# Patient Record
Sex: MALE | Race: WHITE | NOT HISPANIC OR LATINO | URBAN - METROPOLITAN AREA
[De-identification: names, ages, dates, MRNs, and addresses within clinical notes are randomized per-mention and may not be internally consistent; named-entity substitution may affect disease eponyms.]

---

## 2018-09-13 ENCOUNTER — EMERGENCY (EMERGENCY)
Facility: HOSPITAL | Age: 53
LOS: 1 days | Discharge: HOME | End: 2018-09-13
Attending: EMERGENCY MEDICINE
Payer: SELF-PAY

## 2018-09-13 VITALS
OXYGEN SATURATION: 98 % | HEART RATE: 83 BPM | SYSTOLIC BLOOD PRESSURE: 132 MMHG | RESPIRATION RATE: 18 BRPM | DIASTOLIC BLOOD PRESSURE: 87 MMHG | TEMPERATURE: 98 F

## 2018-09-13 VITALS — WEIGHT: 134.92 LBS | HEIGHT: 69 IN

## 2018-09-13 PROCEDURE — 10060 I&D ABSCESS SIMPLE/SINGLE: CPT

## 2018-09-13 PROCEDURE — 99283 EMERGENCY DEPT VISIT LOW MDM: CPT | Mod: 25

## 2018-09-13 PROCEDURE — 10060 I&D ABSCESS SIMPLE/SINGLE: CPT | Mod: F7

## 2018-09-13 RX ORDER — IBUPROFEN 200 MG
600 TABLET ORAL ONCE
Refills: 0 | Status: COMPLETED | OUTPATIENT
Start: 2018-09-13 | End: 2018-09-13

## 2018-09-13 RX ORDER — CIPROFLOXACIN LACTATE 400MG/40ML
500 VIAL (ML) INTRAVENOUS ONCE
Refills: 0 | Status: COMPLETED | OUTPATIENT
Start: 2018-09-13 | End: 2018-09-13

## 2018-09-13 RX ORDER — MOXIFLOXACIN HYDROCHLORIDE TABLETS, 400 MG 400 MG/1
1 TABLET, FILM COATED ORAL
Qty: 14 | Refills: 0
Start: 2018-09-13 | End: 2018-09-19

## 2018-09-13 RX ADMIN — Medication 500 MILLIGRAM(S): at 10:04

## 2018-09-13 RX ADMIN — Medication 600 MILLIGRAM(S): at 10:04

## 2018-09-13 NOTE — ED STATDOCS - OBJECTIVE STATEMENT
54 y/o M pt presents to ED c/o finger pain/swelling s/p injury last week. Pt works in [employer] and states that a red snapper's dorsal fin hit his finger. Pt used peroxide and saltwater at home to treat with no improvement.

## 2018-09-13 NOTE — ED ADULT NURSE NOTE - OBJECTIVE STATEMENT
Pt presents to ED for eval of c/o right hand swelling and pain x1 week s/p abrasion secondary to dorsal fin of fish. Pt denies fever or chills. C/o intermittent radiating pain to wrist and elbow. Ongoing eval in progress.

## 2018-09-13 NOTE — ED ADULT NURSE NOTE - NSIMPLEMENTINTERV_GEN_ALL_ED
Implemented All Universal Safety Interventions:  [place] to call system. Call bell, personal items and telephone within reach. Instruct patient to call for assistance. Room bathroom lighting operational. Non-slip footwear when patient is off stretcher. Physically safe environment: no spills, clutter or unnecessary equipment. Stretcher in lowest position, wheels locked, appropriate side rails in place.

## 2019-06-06 ENCOUNTER — EMERGENCY (EMERGENCY)
Facility: HOSPITAL | Age: 54
LOS: 1 days | Discharge: HOME | End: 2019-06-06
Attending: EMERGENCY MEDICINE
Payer: SELF-PAY

## 2019-06-06 VITALS
SYSTOLIC BLOOD PRESSURE: 154 MMHG | DIASTOLIC BLOOD PRESSURE: 96 MMHG | OXYGEN SATURATION: 97 % | TEMPERATURE: 99 F | HEIGHT: 69 IN | HEART RATE: 100 BPM | RESPIRATION RATE: 16 BRPM | WEIGHT: 130.07 LBS

## 2019-06-06 PROCEDURE — 99283 EMERGENCY DEPT VISIT LOW MDM: CPT

## 2019-06-06 PROCEDURE — 73630 X-RAY EXAM OF FOOT: CPT

## 2019-06-06 PROCEDURE — 87070 CULTURE OTHR SPECIMN AEROBIC: CPT

## 2019-06-06 PROCEDURE — 73630 X-RAY EXAM OF FOOT: CPT | Mod: 26,RT

## 2019-06-06 RX ORDER — TETANUS TOXOID, REDUCED DIPHTHERIA TOXOID AND ACELLULAR PERTUSSIS VACCINE, ADSORBED 5; 2.5; 8; 8; 2.5 [IU]/.5ML; [IU]/.5ML; UG/.5ML; UG/.5ML; UG/.5ML
0.5 SUSPENSION INTRAMUSCULAR ONCE
Refills: 0 | Status: DISCONTINUED | OUTPATIENT
Start: 2019-06-06 | End: 2019-06-11

## 2019-06-06 RX ORDER — CEPHALEXIN 500 MG
500 CAPSULE ORAL EVERY 12 HOURS
Refills: 0 | Status: DISCONTINUED | OUTPATIENT
Start: 2019-06-06 | End: 2019-06-11

## 2019-06-06 RX ORDER — CEPHALEXIN 500 MG
1 CAPSULE ORAL
Qty: 20 | Refills: 0
Start: 2019-06-06 | End: 2019-06-10

## 2019-06-06 RX ORDER — IBUPROFEN 200 MG
1 TABLET ORAL
Qty: 20 | Refills: 0
Start: 2019-06-06 | End: 2019-06-10

## 2019-06-06 NOTE — ED STATDOCS - NS ED ROS FT
ROS: CONTUSIONAL: Denies fever, chills, fatigue, wt loss. HEAD: Denies trauma, HA, Dizziness. EYE: Denies Acute visual changes, diplopia. ENMT: Denies change in hearing, tinnitus, epistaxis, difficulty swallowing, sore throat. CARDIO: Denies CP, palpitations, edema. RESP: Denies Cough, SOB , Diff breathing, hemoptysis. GI: Denies N/V, ABD pain, change in bowel movement. URINARY: Denies difficulty urinating, pelvic pain. MS:  (+) foot pain (-) Denies joint pain, back pain, weakness, decreased ROM, swelling. NEURO: Denies change in gait, seizures, loss of sensation, dizziness, confusion LOC.  PSY: NO SI/HI. SKIN: (+) swelling

## 2019-06-06 NOTE — ED STATDOCS - CLINICAL SUMMARY MEDICAL DECISION MAKING FREE TEXT BOX
PT with stable VS, no acute distress, non toxic appearing, tolerating PO in the ED, PT seen by podiatry wound opened and cultured by podiatry recombed dc home on PO ABX, follow up to podiatry in 24 hr, placed on crutches', PT educated on proper use of crutches and demonstrated proficient use prior to discharge. educated about when to return to the ED if needed. PT verbalizes that he understands all instructions and results. Pt educated about the risks vs benefits of imaging at this time and agrees that not warranted for their symptoms, and PE

## 2019-06-06 NOTE — CONSULT NOTE ADULT - SUBJECTIVE AND OBJECTIVE BOX
HPI:  52 yo male with no significant PMHx presents to ED with chief complaint of Right Foot Plantar painful blister. Pt states sunday night he felt something bitting the bottom of his right foot. Pt believes a spider bit him, but is uncertain. Pt states no recent change in shoe/socks and denies any injury to the right foot. Pt denies going hiking. Pt admit change in work place. Pt denies stepping on foreign body. Pt states the right foot is painful to walk, so hes been walking on tipy toes. Pt denies being diabetic. Pt denies N/V/C/F/SOB       PAST MEDICAL & SURGICAL HISTORY:  No pertinent past medical history  No significant past surgical history      ALLERGIES: sulfa drugs (Unknown)      MEDS:  diphtheria/tetanus/pertussis (acellular) Vaccine (ADAcel) 0.5 milliLiter(s) IntraMuscular once      SOCIAL HISTORY:  Smoker:      FAMILY HISTORY:      VITALS:  Vital Signs Last 24 Hrs  T(C): 37.2 (06 Jun 2019 15:02), Max: 37.2 (06 Jun 2019 15:02)  T(F): 98.9 (06 Jun 2019 15:02), Max: 98.9 (06 Jun 2019 15:02)  HR: 100 (06 Jun 2019 15:02) (100 - 100)  BP: 154/96 (06 Jun 2019 15:02) (154/96 - 154/96)  BP(mean): --  RR: 16 (06 Jun 2019 15:02) (16 - 16)  SpO2: 97% (06 Jun 2019 15:02) (97% - 97%)    LABS/DIAGNOSTIC TESTS:    CULTURES: Pending       RADIOLOGY: Reading Pending; No Foreign body noted    PE: Right Foot   Vascular: DP/PT: 1/4; CFT< 3 sec x 5; TG: WNL; mild edema noted  Derm: Large (2 cm by 2 cm ) serous blister noted; serous/yellow colored drainage noted upon lancing; no mal odor noted; no pus noted; ridge looking lesion noted underneath the blister; no clinical sign of infection noted; no erythema noted; no fluctuance noted; no probing to bone notes   Neuro: Protective sensation intact    A: Right Foot Plantar Blister     P:  Patient evaluated and chart reviewed  Xray ordered; reading pending (no foreign body noted)   Educated pt on proper foot care and change in shoe gear  Bedside Incision and Drainage performed in ED of the Right Foot Plantar Blister. Using sterile # 15 blade the blister was lanced and deroofed; Serous drainage noted and cultures send to lab; No pus noted  Stab incision made no pus noted; serous sanguinous drainage noted; No clinical sign of infection noted  Betadine/DSD applied to the right foot  Instructed pt to keep dressing clean dry and intact to the right foot  Pt was given surgical shoe with crutches. Instructed pt to be non weight bearing to the right foot. Pt was crutched trained  Recommend oral abx. Instructed pt to finish his antibiotic course  Instructed pt to return to clinic or ED if any clinical sign of infection noted. Pt expressed verbal understanding   Instructed pt to take over the counter pain medication.  Instructed pt to follow up with Dr. Gibson Friday for further wound care.  F/up on Cx results outpatient   Pt expressed verbal understanding

## 2019-06-06 NOTE — ED STATDOCS - PROGRESS NOTE DETAILS
Podiatry contacted and called for consult. Makenna PA visited pt bedside multiple time to attempt to locate them no answer when called, unable to locate pt pt assumed [facility]. PT left wo signing paper and wo receiving medications, unknown at this time if pt is aware of were to follow up wo proper paper work.

## 2019-06-06 NOTE — ED STATDOCS - ATTENDING CONTRIBUTION TO CARE
53 year old c/o right foot pain.  He believes that while working he may have been bitten by an insect but is unsure and did not find any insects on his body.  He denies direct trauma to the foot.  Patient with tenderness to sole of right foot 2x2 cm serous blister noted no crepitus.  Xray negative for foreign body or evidence of gas necrosis.  Podiatry was consulted, evaluated the patient and recommended abx.  Patient eloped from the ER.

## 2019-06-06 NOTE — ED ADULT TRIAGE NOTE - CHIEF COMPLAINT QUOTE
pt c/o pain to bottom of foot since sunday.  states he thinks he was bitten by something while he was sleeping.  awoke sunday morning with two blood blisters on bottom of foot.

## 2019-06-06 NOTE — ED STATDOCS - OBJECTIVE STATEMENT
54 y/o M pt with hx of/no pert. hx presents to the ED c/o worsening bottom foot pain with assoc. swelling. Reports Sunday night he noticed 2 black dots on the bottom of his foot, appeared to be a blister with tenderness. Pt has not seen a doctor for same complaint. Pt now has difficulty walking. 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 Pt is not currently on medication. Denies fever. No further complaints at this time.

## 2022-07-22 ENCOUNTER — EMERGENCY (EMERGENCY)
Facility: HOSPITAL | Age: 57
LOS: 1 days | Discharge: HOME | End: 2022-07-22
Admitting: EMERGENCY MEDICINE

## 2022-07-22 DIAGNOSIS — K08.89 OTHER SPECIFIED DISORDERS OF TEETH AND SUPPORTING STRUCTURES: ICD-10-CM

## 2022-07-22 PROCEDURE — 99283 EMERGENCY DEPT VISIT LOW MDM: CPT

## 2024-09-16 ENCOUNTER — NON-APPOINTMENT (OUTPATIENT)
Age: 59
End: 2024-09-16

## 2024-09-23 ENCOUNTER — APPOINTMENT (OUTPATIENT)
Dept: ORTHOPEDIC SURGERY | Facility: CLINIC | Age: 59
End: 2024-09-23

## 2024-09-24 ENCOUNTER — APPOINTMENT (OUTPATIENT)
Dept: ORTHOPEDIC SURGERY | Facility: CLINIC | Age: 59
End: 2024-09-24